# Patient Record
Sex: MALE | Race: ASIAN | NOT HISPANIC OR LATINO
[De-identification: names, ages, dates, MRNs, and addresses within clinical notes are randomized per-mention and may not be internally consistent; named-entity substitution may affect disease eponyms.]

---

## 2018-05-10 ENCOUNTER — APPOINTMENT (OUTPATIENT)
Dept: RADIATION ONCOLOGY | Facility: CLINIC | Age: 83
End: 2018-05-10
Payer: MEDICARE

## 2018-05-10 VITALS
SYSTOLIC BLOOD PRESSURE: 158 MMHG | HEIGHT: 71 IN | RESPIRATION RATE: 15 BRPM | OXYGEN SATURATION: 98 % | HEART RATE: 75 BPM | BODY MASS INDEX: 25.25 KG/M2 | WEIGHT: 180.34 LBS | TEMPERATURE: 98.2 F | DIASTOLIC BLOOD PRESSURE: 81 MMHG

## 2018-05-10 PROCEDURE — 99213 OFFICE O/P EST LOW 20 MIN: CPT

## 2022-03-08 ENCOUNTER — APPOINTMENT (OUTPATIENT)
Dept: RADIATION ONCOLOGY | Facility: CLINIC | Age: 87
End: 2022-03-08
Payer: MEDICARE

## 2022-03-08 VITALS
SYSTOLIC BLOOD PRESSURE: 150 MMHG | WEIGHT: 171.96 LBS | BODY MASS INDEX: 23.98 KG/M2 | HEART RATE: 61 BPM | TEMPERATURE: 97.16 F | OXYGEN SATURATION: 98 % | DIASTOLIC BLOOD PRESSURE: 76 MMHG | RESPIRATION RATE: 16 BRPM

## 2022-03-08 DIAGNOSIS — C61 MALIGNANT NEOPLASM OF PROSTATE: ICD-10-CM

## 2022-03-08 PROCEDURE — 99212 OFFICE O/P EST SF 10 MIN: CPT

## 2022-03-08 NOTE — DISEASE MANAGEMENT
[1] : T1 [c] : c [0] : M0 [IIA] : IIA [Radiation Therapy] : Radiation Therapy [LDR] : LDR [RadiationCompletedDate] : 6/13/12 [LDRDose] : [id number]

## 2022-03-08 NOTE — REVIEW OF SYSTEMS
[Negative] : Psychiatric [Constipation: Grade 0] : Constipation: Grade 0 [Diarrhea: Grade 0] : Diarrhea: Grade 0 [Hematuria: Grade 0] : Hematuria: Grade 0 [Urinary Retention: Grade 0] : Urinary Retention: Grade 0 [Urinary Urgency: Grade 1 - Present] : Urinary Urgency: Grade 1 - Present [Ejaculation Disorder: Grade 0] : Ejaculation Disorder: Grade 0 [Erectile Dysfunction: Grade 0] : Erectile Dysfunction: Grade 0 [Fatigue: Grade 0] : Fatigue: Grade 0 [Nocturia] : nocturia [IPSS Score (0-40): ___] : IPSS score: [unfilled] [EPIC-CP Score (0-60): ___] : EPIC-CP score: [unfilled] [Urinary Incontinence: Grade 0] : Urinary Incontinence: Grade 0  [Urinary Tract Pain: Grade 1 - Mild pain] : Urinary Tract Pain: Grade 1 - Mild pain [Urinary Frequency: Grade 0] : Urinary Frequency: Grade 0 [FreeTextEntry2] : occasional dribbling

## 2022-03-08 NOTE — HISTORY OF PRESENT ILLNESS
[FreeTextEntry1] : Mr. Arguello is an 81 year old gentleman with adenocarcinoma of the prostate. He underwent treatment with radioactive seed implant on 6/13/12. Patient tolerated treatment well.\par \par Patient presents here today for follow up. He report hematuria on 2/22, 3/1, and 3/4/22. He went to [facility] ER on 3/4/22. CT abdomen and pelvis done in ER, result showed no renal or ureteral calculi. No hydronephrosis and urinalysis negative for infection. Today, his hematuria had improved.

## 2022-03-08 NOTE — PHYSICAL EXAM
[Normal] : Normal sphincter tone, no rectal mass, no prostate nodules, prostate not tender and not enlarged [Size (2+)] : size was 2+ [Nl Inspection] : the anus was normal on inspection. [Nl Sphincter Tone] : normal sphincter tone [Prostate Hard Area Or Nodule Bilaterally] : had no palpable nodules [Rectal Exam - Prostate] : was not indurated [Prostate Tenderness] : was not tender [Prostate Fluctuant] : was not fluctuant [Seminal Vesicles Swelling Right] : was not swollen [Seminal Vesicles Tenderness Right] : was not tender [Seminal Vesicles Swelling Left] : was not swollen [Seminal Vesicles Tenderness Left] : was not tender [___] : did not have a mass [Rectal Tenderness] : no tenderness

## 2022-03-09 LAB
APPEARANCE: CLEAR
BILIRUBIN URINE: NEGATIVE
BLOOD URINE: NEGATIVE
COLOR: NORMAL
GLUCOSE QUALITATIVE U: NEGATIVE
KETONES URINE: NEGATIVE
LEUKOCYTE ESTERASE URINE: NEGATIVE
NITRITE URINE: NEGATIVE
PH URINE: 5.5
PROTEIN URINE: NEGATIVE
PSA SERPL-MCNC: <0.01 NG/ML
SPECIFIC GRAVITY URINE: 1.02
UROBILINOGEN URINE: NORMAL

## 2023-03-07 NOTE — DISEASE MANAGEMENT
[1] : T1 [c] : c [0] : M0 [IIA] : IIA [Radiation Therapy] : Radiation Therapy [LDR] : LDR [RadiationCompletedDate] : 6/13/12 [LDRDose] : [id number]

## 2023-03-07 NOTE — HISTORY OF PRESENT ILLNESS
[FreeTextEntry1] : Mr. Arguello is an 81 year old gentleman with adenocarcinoma of the prostate. He underwent treatment with radioactive seed implant on 6/13/12. Patient tolerated treatment well.\par \par Patient presents here today for follow up. He report hematuria on 2/22, 3/1, and 3/4/22. He went to [facility] ER on 3/4/22. CT abdomen and pelvis done in ER, result showed no renal or ureteral calculi. No hydronephrosis and urinalysis negative for infection. Today, his hematuria had improved.

## 2023-03-07 NOTE — PHYSICAL EXAM
[Size (2+)] : size was 2+ [Prostate Hard Area Or Nodule Bilaterally] : had no palpable nodules [Rectal Exam - Prostate] : was not indurated [Prostate Tenderness] : was not tender [Prostate Fluctuant] : was not fluctuant [Seminal Vesicles Swelling Right] : was not swollen [Seminal Vesicles Tenderness Right] : was not tender [Seminal Vesicles Swelling Left] : was not swollen [Seminal Vesicles Tenderness Left] : was not tender [___] : did not have a mass [Nl Inspection] : the anus was normal on inspection. [Nl Sphincter Tone] : normal sphincter tone [Rectal Tenderness] : no tenderness [Normal] : oriented to person, place and time, the affect was normal, the mood was normal and not anxious

## 2023-03-07 NOTE — REVIEW OF SYSTEMS
[IPSS Score (0-40): ___] : IPSS score: [unfilled] [EPIC-CP Score (0-60): ___] : EPIC-CP score: [unfilled] [Negative] : Psychiatric [Constipation: Grade 0] : Constipation: Grade 0 [Diarrhea: Grade 0] : Diarrhea: Grade 0 [Hematuria: Grade 0] : Hematuria: Grade 0 [Urinary Incontinence: Grade 0] : Urinary Incontinence: Grade 0  [Urinary Retention: Grade 0] : Urinary Retention: Grade 0 [Urinary Tract Pain: Grade 1 - Mild pain] : Urinary Tract Pain: Grade 1 - Mild pain [Urinary Urgency: Grade 1 - Present] : Urinary Urgency: Grade 1 - Present [Urinary Frequency: Grade 0] : Urinary Frequency: Grade 0 [Ejaculation Disorder: Grade 0] : Ejaculation Disorder: Grade 0 [Erectile Dysfunction: Grade 0] : Erectile Dysfunction: Grade 0 [FreeTextEntry2] : occasional dribbling [Fatigue: Grade 0] : Fatigue: Grade 0

## 2023-03-08 ENCOUNTER — APPOINTMENT (OUTPATIENT)
Dept: RADIATION ONCOLOGY | Facility: CLINIC | Age: 88
End: 2023-03-08